# Patient Record
Sex: MALE | Race: ASIAN | NOT HISPANIC OR LATINO | URBAN - METROPOLITAN AREA
[De-identification: names, ages, dates, MRNs, and addresses within clinical notes are randomized per-mention and may not be internally consistent; named-entity substitution may affect disease eponyms.]

---

## 2024-05-29 ENCOUNTER — EMERGENCY (EMERGENCY)
Facility: HOSPITAL | Age: 54
LOS: 1 days | Discharge: HOME | End: 2024-05-29
Attending: STUDENT IN AN ORGANIZED HEALTH CARE EDUCATION/TRAINING PROGRAM
Payer: COMMERCIAL

## 2024-05-29 VITALS
RESPIRATION RATE: 16 BRPM | SYSTOLIC BLOOD PRESSURE: 115 MMHG | WEIGHT: 152.12 LBS | DIASTOLIC BLOOD PRESSURE: 74 MMHG | OXYGEN SATURATION: 100 % | TEMPERATURE: 98 F | HEIGHT: 66 IN | HEART RATE: 66 BPM

## 2024-05-29 PROCEDURE — 96372 THER/PROPH/DIAG INJ SC/IM: CPT

## 2024-05-29 PROCEDURE — 99284 EMERGENCY DEPT VISIT MOD MDM: CPT

## 2024-05-29 PROCEDURE — 99283 EMERGENCY DEPT VISIT LOW MDM: CPT | Mod: 25

## 2024-05-29 RX ORDER — KETOROLAC TROMETHAMINE 30 MG/ML
30 SYRINGE (ML) INJECTION ONCE
Refills: 0 | Status: DISCONTINUED | OUTPATIENT
Start: 2024-05-29 | End: 2024-05-29

## 2024-05-29 RX ORDER — DIAZEPAM 5 MG
1 TABLET ORAL
Qty: 10 | Refills: 0
Start: 2024-05-29

## 2024-05-29 RX ORDER — DIAZEPAM 5 MG
2 TABLET ORAL ONCE
Refills: 0 | Status: DISCONTINUED | OUTPATIENT
Start: 2024-05-29 | End: 2024-05-29

## 2024-05-29 RX ORDER — IBUPROFEN 200 MG
1 TABLET ORAL
Qty: 20 | Refills: 0
Start: 2024-05-29

## 2024-05-29 RX ORDER — LIDOCAINE 4 G/100G
1 CREAM TOPICAL ONCE
Refills: 0 | Status: COMPLETED | OUTPATIENT
Start: 2024-05-29 | End: 2024-05-29

## 2024-05-29 RX ADMIN — Medication 2 MILLIGRAM(S): at 10:18

## 2024-05-29 RX ADMIN — Medication 30 MILLIGRAM(S): at 10:48

## 2024-05-29 RX ADMIN — LIDOCAINE 1 PATCH: 4 CREAM TOPICAL at 10:17

## 2024-05-29 RX ADMIN — Medication 30 MILLIGRAM(S): at 10:18

## 2024-05-29 NOTE — ED PROVIDER NOTE - PATIENT PORTAL LINK FT
Patient had syncopal episode at work approximately noon. Patient reports ~30 seconds of LOC. Guided to the ground, denies head injury. PMHx hypothyroid. Denies CP, SOB, n/v/d, abd pain, chills, fever. No distress. You can access the FollowMyHealth Patient Portal offered by [facility] by registering at the following website: [email or web address]. By joining [facility]’s FollowMyHealth portal, you will also be able to view your health information using other applications (apps) compatible with our system.

## 2024-05-29 NOTE — ED ADULT NURSE NOTE - NSFALLUNIVINTERV_ED_ALL_ED
Bed/Stretcher in lowest position, wheels locked, appropriate side rails in place/Call bell, personal items and telephone in reach/Instruct patient to call for assistance before getting out of bed/chair/stretcher/Non-slip footwear applied when patient is off stretcher/[place] to call system/Physically safe environment - no spills, clutter or unnecessary equipment/Purposeful proactive rounding/Room/bathroom lighting operational, light cord in reach

## 2024-05-29 NOTE — ED PROVIDER NOTE - OBJECTIVE STATEMENT
53-year-old presenting with right lower back pain symptoms started 3 weeks ago but worsened 3 days ago states that pain started 3 weeks ago after "sleeping  wrong" endorses that pain was improving but then worsened 3 days ago when lifting his son denies any fall fever or drug use urinary or fecal incontinence weakness endorses pain with movement this pain is to right side of his lower back denies any midline

## 2024-05-29 NOTE — ED PROVIDER NOTE - PHYSICAL EXAMINATION
no midline deformity patient endorses pain over right paraspinal and above iliac crest denies any hip pain denies any lower abdominal tenderness     ambulatory sensation intact in distal extremity

## 2024-05-29 NOTE — ED ADULT NURSE NOTE - OBJECTIVE STATEMENT
Pt presented to the ED c/o Back pain x3 weeks worsening today,  denies any Trauma, numbness or tingling. No distress noted.

## 2024-05-29 NOTE — ED PROVIDER NOTE - PROGRESS NOTE DETAILS
Patient back pain improved, ambulatory endorses feeling better no signs distress no signs of  caude clinically well given med return precaution instructed follow-up PMD

## 2024-05-29 NOTE — ED PROVIDER NOTE - CCCP TRG CHIEF CMPLNT
RAPID RESPONSE NURSE ROUND       Rounding completed with charge RN, Carlos for tachycardia reports pt's metoprolol being up titrated. No additional concerns verbalized at this time. Instructed to call [phone] for further concerns or assistance.         back pain general